# Patient Record
(demographics unavailable — no encounter records)

---

## 2017-03-02 NOTE — ED INITIAL ASSESSMENT (HPI)
Pt states her therapist is concerned about \"some thought\" she has been having. Pt states she is sad, confused \"about where her life stands right now,\" agitated and anxious. Pt claims to be in a lot of (family-related) stress.   She sometimes thinks of

## 2017-03-03 PROBLEM — F10.94 ALCOHOL-INDUCED MOOD DISORDER (HCC): Status: ACTIVE | Noted: 2017-03-03

## 2017-03-03 PROBLEM — F60.3 BORDERLINE PERSONALITY DISORDER (HCC): Status: ACTIVE | Noted: 2017-03-03

## 2017-03-03 PROBLEM — F32.A DEPRESSION: Status: ACTIVE | Noted: 2017-03-03

## 2017-03-03 NOTE — ED PROVIDER NOTES
Patient Seen in: [facility] Emergency Department    History   Patient presents with:  Suicidal    Stated Complaint: psych eval    HPI    Patient is a 15-year-old female who arrives to the ER with her  for possible suicidal thoughts that she Labs Reviewed   URINALYSIS WITH CULTURE REFLEX - Abnormal; Notable for the following:     Ketones Urine Trace (*)     Leukocyte Esterase Urine Small (*)     Hyaline Casts 6 (*)     Bacteria Urine Few (*)     All other components within normal limits

## 2017-03-03 NOTE — ED NOTES
Per medics, Pt . Pt anxious. Spoke with Dr Galvan. Pt had recent dose of Xanax. Pt cleared per Dr Galvan for transfer to [facility].

## 2017-03-03 NOTE — BH PROGRESS NOTE
Pt accepted at [facility], but after 7am per Marcelino Farley. [facility] does not have the appropriate staff for a one to one until this time.     Marcelino Farley instructed that the nurse can call RN to RN at any time, but to call for transport at [id number] [facility] @ [phone]

## 2017-03-03 NOTE — BH LEVEL OF CARE ASSESSMENT
Comprehensive Assessment Note    General Questions  Why are you here?: \"Well I was meeting with my therapist. And I have been depressed. Because of this chemical sensitivity I cannot take medications.  I have been having difficulties with my  and my worthless, I am a loser, I do not want to be here anymore. Goodbye. \" Pt  and  are not living together, and are in the process of a divorce.  Per pt's  pt is threatening to get  fired from his job and evicted from where he is currently heard pt state \"Maybe I shouldn't be here\" or I should be dead    Past Suicidal Ideation: No  Past Suicide Plan: No  Past Suicide Intent: No  Past Suicide Rehearsal: No  Past Suicide Attempt: No  Past Suicide Risk Collateral Provided By[de-identified]     Desc comfortable. Pt reports a lack of interest in working out,    Anxiety Symptoms: Generalized; Social phobias;Panic attack (Pt reports issues with social situations because of the chemical sensitivity and hearing loss. )  Panic Attacks: Pt reports being self than as prescribed) in the past 30 days?: Yes  Chemical 1  Type of Other Chemical Used: Alcohol (describe)  Amount/Frequency: \"A few times a week. \" \"It's hard to say because it depends on how stressed I am. Lately I have been drinking more and could dri of Consciousness: Alert  Exhibited Behavior : Appropriate to situation; Cooperative  Gait/Movement:  (ALEX - pt in hospital gown )  Speech Characteristics: Normal rate;Normal rhythm;Normal volume  Concentration: Unimpaired  Memory: Recent memory intact; Remot Review  Precautions: Suicide

## 2017-03-06 PROBLEM — IMO0002 ALCOHOL USE DISORDER: Status: ACTIVE | Noted: 2017-03-06

## 2017-03-07 PROCEDURE — 88175 CYTOPATH C/V AUTO FLUID REDO: CPT | Performed by: OBSTETRICS & GYNECOLOGY

## 2017-03-07 PROCEDURE — 87624 HPV HI-RISK TYP POOLED RSLT: CPT | Performed by: OBSTETRICS & GYNECOLOGY
